# Patient Record
Sex: FEMALE | Race: WHITE | NOT HISPANIC OR LATINO | Employment: OTHER | URBAN - METROPOLITAN AREA
[De-identification: names, ages, dates, MRNs, and addresses within clinical notes are randomized per-mention and may not be internally consistent; named-entity substitution may affect disease eponyms.]

---

## 2024-07-06 ENCOUNTER — APPOINTMENT (EMERGENCY)
Dept: CT IMAGING | Facility: HOSPITAL | Age: 69
End: 2024-07-06
Payer: MEDICARE

## 2024-07-06 ENCOUNTER — HOSPITAL ENCOUNTER (EMERGENCY)
Facility: HOSPITAL | Age: 69
Discharge: HOME | End: 2024-07-06
Attending: EMERGENCY MEDICINE
Payer: MEDICARE

## 2024-07-06 VITALS
TEMPERATURE: 98.4 F | HEART RATE: 64 BPM | SYSTOLIC BLOOD PRESSURE: 188 MMHG | RESPIRATION RATE: 15 BRPM | OXYGEN SATURATION: 92 % | WEIGHT: 176 LBS | DIASTOLIC BLOOD PRESSURE: 91 MMHG

## 2024-07-06 DIAGNOSIS — R42 DIZZINESS: Primary | ICD-10-CM

## 2024-07-06 LAB
ALBUMIN SERPL BCG-MCNC: 4.6 G/DL (ref 3.5–5)
ALP SERPL-CCNC: 65 U/L (ref 34–104)
ALT SERPL W P-5'-P-CCNC: 17 U/L (ref 7–52)
ANION GAP SERPL CALCULATED.3IONS-SCNC: 7 MMOL/L (ref 4–13)
APTT PPP: 29 SECONDS (ref 23–37)
AST SERPL W P-5'-P-CCNC: 20 U/L (ref 13–39)
ATRIAL RATE: 65 BPM
BASOPHILS # BLD AUTO: 0.05 THOUSANDS/ÂΜL (ref 0–0.1)
BASOPHILS NFR BLD AUTO: 1 % (ref 0–1)
BILIRUB SERPL-MCNC: 0.54 MG/DL (ref 0.2–1)
BUN SERPL-MCNC: 15 MG/DL (ref 5–25)
CALCIUM SERPL-MCNC: 10 MG/DL (ref 8.4–10.2)
CARDIAC TROPONIN I PNL SERPL HS: 3 NG/L
CHLORIDE SERPL-SCNC: 100 MMOL/L (ref 96–108)
CO2 SERPL-SCNC: 30 MMOL/L (ref 21–32)
CREAT SERPL-MCNC: 0.63 MG/DL (ref 0.6–1.3)
EOSINOPHIL # BLD AUTO: 0.07 THOUSAND/ÂΜL (ref 0–0.61)
EOSINOPHIL NFR BLD AUTO: 1 % (ref 0–6)
ERYTHROCYTE [DISTWIDTH] IN BLOOD BY AUTOMATED COUNT: 13 % (ref 11.6–15.1)
GFR SERPL CREATININE-BSD FRML MDRD: 91 ML/MIN/1.73SQ M
GLUCOSE SERPL-MCNC: 130 MG/DL (ref 65–140)
GLUCOSE SERPL-MCNC: 150 MG/DL (ref 65–140)
HCT VFR BLD AUTO: 54.1 % (ref 34.8–46.1)
HGB BLD-MCNC: 17.6 G/DL (ref 11.5–15.4)
IMM GRANULOCYTES # BLD AUTO: 0.04 THOUSAND/UL (ref 0–0.2)
IMM GRANULOCYTES NFR BLD AUTO: 0 % (ref 0–2)
INR PPP: 0.93 (ref 0.84–1.19)
LYMPHOCYTES # BLD AUTO: 2.09 THOUSANDS/ÂΜL (ref 0.6–4.47)
LYMPHOCYTES NFR BLD AUTO: 19 % (ref 14–44)
MAGNESIUM SERPL-MCNC: 2.2 MG/DL (ref 1.9–2.7)
MCH RBC QN AUTO: 30.1 PG (ref 26.8–34.3)
MCHC RBC AUTO-ENTMCNC: 32.5 G/DL (ref 31.4–37.4)
MCV RBC AUTO: 93 FL (ref 82–98)
MONOCYTES # BLD AUTO: 0.76 THOUSAND/ÂΜL (ref 0.17–1.22)
MONOCYTES NFR BLD AUTO: 7 % (ref 4–12)
NEUTROPHILS # BLD AUTO: 8.03 THOUSANDS/ÂΜL (ref 1.85–7.62)
NEUTS SEG NFR BLD AUTO: 72 % (ref 43–75)
NRBC BLD AUTO-RTO: 0 /100 WBCS
P AXIS: 59 DEGREES
PLATELET # BLD AUTO: 336 THOUSANDS/UL (ref 149–390)
PMV BLD AUTO: 9.6 FL (ref 8.9–12.7)
POTASSIUM SERPL-SCNC: 4.8 MMOL/L (ref 3.5–5.3)
PR INTERVAL: 162 MS
PROT SERPL-MCNC: 7.2 G/DL (ref 6.4–8.4)
PROTHROMBIN TIME: 12.9 SECONDS (ref 11.6–14.5)
QRS AXIS: 53 DEGREES
QRSD INTERVAL: 90 MS
QT INTERVAL: 436 MS
QTC INTERVAL: 453 MS
RBC # BLD AUTO: 5.85 MILLION/UL (ref 3.81–5.12)
SODIUM SERPL-SCNC: 137 MMOL/L (ref 135–147)
T WAVE AXIS: 48 DEGREES
TSH SERPL DL<=0.05 MIU/L-ACNC: 2.48 UIU/ML (ref 0.45–4.5)
VENTRICULAR RATE: 65 BPM
WBC # BLD AUTO: 11.04 THOUSAND/UL (ref 4.31–10.16)

## 2024-07-06 PROCEDURE — 96361 HYDRATE IV INFUSION ADD-ON: CPT

## 2024-07-06 PROCEDURE — 70498 CT ANGIOGRAPHY NECK: CPT

## 2024-07-06 PROCEDURE — 99285 EMERGENCY DEPT VISIT HI MDM: CPT

## 2024-07-06 PROCEDURE — 85610 PROTHROMBIN TIME: CPT

## 2024-07-06 PROCEDURE — 84484 ASSAY OF TROPONIN QUANT: CPT

## 2024-07-06 PROCEDURE — 36415 COLL VENOUS BLD VENIPUNCTURE: CPT

## 2024-07-06 PROCEDURE — 70496 CT ANGIOGRAPHY HEAD: CPT

## 2024-07-06 PROCEDURE — 93005 ELECTROCARDIOGRAM TRACING: CPT

## 2024-07-06 PROCEDURE — 93010 ELECTROCARDIOGRAM REPORT: CPT | Performed by: INTERNAL MEDICINE

## 2024-07-06 PROCEDURE — 83735 ASSAY OF MAGNESIUM: CPT

## 2024-07-06 PROCEDURE — 96374 THER/PROPH/DIAG INJ IV PUSH: CPT

## 2024-07-06 PROCEDURE — 82948 REAGENT STRIP/BLOOD GLUCOSE: CPT

## 2024-07-06 PROCEDURE — 84443 ASSAY THYROID STIM HORMONE: CPT

## 2024-07-06 PROCEDURE — 85730 THROMBOPLASTIN TIME PARTIAL: CPT

## 2024-07-06 PROCEDURE — 99284 EMERGENCY DEPT VISIT MOD MDM: CPT

## 2024-07-06 PROCEDURE — 85025 COMPLETE CBC W/AUTO DIFF WBC: CPT

## 2024-07-06 PROCEDURE — 80053 COMPREHEN METABOLIC PANEL: CPT

## 2024-07-06 RX ORDER — MECLIZINE HCL 12.5 MG/1
25 TABLET ORAL ONCE
Status: COMPLETED | OUTPATIENT
Start: 2024-07-06 | End: 2024-07-06

## 2024-07-06 RX ORDER — METOCLOPRAMIDE HYDROCHLORIDE 5 MG/ML
10 INJECTION INTRAMUSCULAR; INTRAVENOUS ONCE
Status: DISCONTINUED | OUTPATIENT
Start: 2024-07-06 | End: 2024-07-06

## 2024-07-06 RX ORDER — MECLIZINE HYDROCHLORIDE 25 MG/1
25 TABLET ORAL 3 TIMES DAILY PRN
Qty: 21 TABLET | Refills: 0 | Status: SHIPPED | OUTPATIENT
Start: 2024-07-06 | End: 2024-07-13

## 2024-07-06 RX ORDER — ONDANSETRON 4 MG/1
4 TABLET, ORALLY DISINTEGRATING ORAL EVERY 6 HOURS PRN
Qty: 20 TABLET | Refills: 0 | Status: SHIPPED | OUTPATIENT
Start: 2024-07-06

## 2024-07-06 RX ORDER — ONDANSETRON 4 MG/1
4 TABLET, ORALLY DISINTEGRATING ORAL EVERY 6 HOURS PRN
Qty: 20 TABLET | Refills: 0 | Status: SHIPPED | OUTPATIENT
Start: 2024-07-06 | End: 2024-07-06

## 2024-07-06 RX ORDER — ONDANSETRON 4 MG/1
4 TABLET, ORALLY DISINTEGRATING ORAL ONCE
Status: COMPLETED | OUTPATIENT
Start: 2024-07-06 | End: 2024-07-06

## 2024-07-06 RX ORDER — MECLIZINE HYDROCHLORIDE 25 MG/1
25 TABLET ORAL 3 TIMES DAILY PRN
Qty: 21 TABLET | Refills: 0 | Status: SHIPPED | OUTPATIENT
Start: 2024-07-06 | End: 2024-07-06

## 2024-07-06 RX ORDER — ONDANSETRON 2 MG/ML
4 INJECTION INTRAMUSCULAR; INTRAVENOUS ONCE
Status: COMPLETED | OUTPATIENT
Start: 2024-07-06 | End: 2024-07-06

## 2024-07-06 RX ADMIN — MECLIZINE HYDROCHLORIDE 25 MG: 12.5 TABLET ORAL at 15:18

## 2024-07-06 RX ADMIN — MECLIZINE HYDROCHLORIDE 25 MG: 12.5 TABLET ORAL at 19:56

## 2024-07-06 RX ADMIN — SODIUM CHLORIDE 1000 ML: 0.9 INJECTION, SOLUTION INTRAVENOUS at 15:18

## 2024-07-06 RX ADMIN — ONDANSETRON 4 MG: 4 TABLET, ORALLY DISINTEGRATING ORAL at 19:56

## 2024-07-06 RX ADMIN — MECLIZINE HYDROCHLORIDE 25 MG: 12.5 TABLET ORAL at 18:15

## 2024-07-06 RX ADMIN — IOHEXOL 85 ML: 350 INJECTION, SOLUTION INTRAVENOUS at 16:04

## 2024-07-06 RX ADMIN — ONDANSETRON 4 MG: 2 INJECTION INTRAMUSCULAR; INTRAVENOUS at 15:18

## 2024-07-06 NOTE — ED NOTES
Pt unable to ambulate at this time, states she is too dizzy.     Gabriella Giraldo RN  07/06/24 7323

## 2024-07-06 NOTE — ED PROVIDER NOTES
History  Chief Complaint   Patient presents with    Dizziness     Pt to ED from home c/o dizziness since Thursday and getting worse. States it is much worse when she moves at all. Feels like she is spinning     The patient is a 69-year-old female with PMH of HTN presenting for evaluation of 2 days of dizziness.  The patient awoke 2 days ago and got out of bed quickly and reported having a feeling of being off balance.  She bumped into the TV stand on the left side.  She did not fall or hit her head.  She was able to get back in bed.  She reports thinking this may be vertigo so she has been at home, increasing fluids, and resting.  She notes the past 2 days having this dizziness when trying to get up and walk around.  She notes her symptoms worsened this morning and she feels dizzy even when moving her head or repositioning on the couch/chair.  She called her primary care doctor and was referred here for further workup.  She denies recent illnesses, fevers, chills, cough.  She denies associated headaches, lightheadedness or feeling like she may pass out, vision changes, chest pain, palpitations, difficulty breathing.  She does endorse some nausea with 1 episode of vomiting and route with EMS.  She denies history of vertigo.  She denies trauma and falls.       History provided by:  Patient   used: No    Dizziness  Associated symptoms: nausea and vomiting    Associated symptoms: no chest pain, no diarrhea, no headaches, no palpitations, no shortness of breath and no weakness        None       Past Medical History:   Diagnosis Date    Hypertension        History reviewed. No pertinent surgical history.    History reviewed. No pertinent family history.  I have reviewed and agree with the history as documented.    E-Cigarette/Vaping     E-Cigarette/Vaping Substances     Social History     Tobacco Use    Smoking status: Every Day     Current packs/day: 1.00     Types: Cigarettes    Smokeless tobacco:  Never   Substance Use Topics    Alcohol use: Never    Drug use: Yes     Types: Marijuana       Review of Systems   Constitutional:  Negative for fever.   Respiratory:  Negative for cough and shortness of breath.    Cardiovascular:  Negative for chest pain and palpitations.   Gastrointestinal:  Positive for nausea and vomiting. Negative for abdominal pain and diarrhea.   Musculoskeletal:  Positive for arthralgias (Chronic arthritis pain all over per patient) and neck pain (Left-sided). Negative for gait problem, joint swelling and neck stiffness.   Skin:  Negative for wound.   Neurological:  Positive for dizziness. Negative for tremors, seizures, syncope, facial asymmetry, speech difficulty, weakness, light-headedness, numbness and headaches.   All other systems reviewed and are negative.      Physical Exam  Physical Exam  Vitals and nursing note reviewed.   Constitutional:       General: She is awake. She is not in acute distress (Evidencing mild discomfort, no acute distress).     Appearance: Normal appearance. She is well-developed. She is not ill-appearing, toxic-appearing or diaphoretic.   HENT:      Head: Normocephalic and atraumatic.      Jaw: There is normal jaw occlusion.      Right Ear: Tympanic membrane, ear canal and external ear normal.      Left Ear: Tympanic membrane, ear canal and external ear normal.      Nose: Nose normal.      Mouth/Throat:      Lips: Pink. No lesions.      Mouth: Mucous membranes are moist.      Pharynx: Oropharynx is clear. Uvula midline.   Eyes:      General: Lids are normal. Vision grossly intact. Gaze aligned appropriately. No visual field deficit.     Extraocular Movements: Extraocular movements intact.      Right eye: No nystagmus.      Left eye: No nystagmus.      Conjunctiva/sclera: Conjunctivae normal.      Pupils: Pupils are equal, round, and reactive to light.   Neck:      Trachea: Phonation normal. No abnormal tracheal secretions.   Cardiovascular:      Rate and  Rhythm: Normal rate and regular rhythm.      Pulses:           Radial pulses are 2+ on the right side and 2+ on the left side.        Dorsalis pedis pulses are 2+ on the right side and 2+ on the left side.        Posterior tibial pulses are 2+ on the right side and 2+ on the left side.      Heart sounds: Normal heart sounds, S1 normal and S2 normal. No murmur heard.  Pulmonary:      Effort: Pulmonary effort is normal. No tachypnea or respiratory distress.      Breath sounds: Normal breath sounds and air entry. No stridor, decreased air movement or transmitted upper airway sounds. No decreased breath sounds.   Abdominal:      Palpations: Abdomen is soft.      Tenderness: There is no abdominal tenderness.   Musculoskeletal:         General: Normal range of motion.      Cervical back: Normal range of motion and neck supple.      Right lower leg: No edema.      Left lower leg: No edema.      Comments: PLUMMER, 5/5 strength throughout, sensation intact, no focal joint swelling. Ambulatory with steady gait.   Skin:     General: Skin is warm and dry.      Capillary Refill: Capillary refill takes less than 2 seconds.      Findings: No rash or wound.   Neurological:      General: No focal deficit present.      Mental Status: She is alert and oriented to person, place, and time. Mental status is at baseline.      GCS: GCS eye subscore is 4. GCS verbal subscore is 5. GCS motor subscore is 6.      Cranial Nerves: Cranial nerves 2-12 are intact.      Sensory: Sensation is intact.      Motor: Motor function is intact.      Coordination: Romberg sign negative. Finger-Nose-Finger Test and Heel to Shin Test normal.      Comments: Unable to access gait initially due to dizziness   Psychiatric:         Behavior: Behavior is cooperative.         Vital Signs  ED Triage Vitals   Temperature Pulse Respirations Blood Pressure SpO2   07/06/24 1444 07/06/24 1444 07/06/24 1444 07/06/24 1444 07/06/24 1444   98.4 °F (36.9 °C) 72 18 (!) 219/113 98 %       Temp src Heart Rate Source Patient Position - Orthostatic VS BP Location FiO2 (%)   -- 07/06/24 1711 07/06/24 1444 07/06/24 1444 --    Monitor Sitting Right arm       Pain Score       07/06/24 1444       No Pain           Vitals:    07/06/24 1444 07/06/24 1509 07/06/24 1711 07/06/24 1915   BP: (!) 219/113 (!) 205/109 166/92 (!) 188/91   Pulse: 72 76 71 64   Patient Position - Orthostatic VS: Sitting Sitting           Visual Acuity  Visual Acuity      Flowsheet Row Most Recent Value   L Pupil Size (mm) 3   R Pupil Size (mm) 3            ED Medications  Medications   sodium chloride 0.9 % bolus 1,000 mL (0 mL Intravenous Stopped 7/6/24 1815)   meclizine (ANTIVERT) tablet 25 mg (25 mg Oral Given 7/6/24 1518)   ondansetron (ZOFRAN) injection 4 mg (4 mg Intravenous Given 7/6/24 1518)   iohexol (OMNIPAQUE) 350 MG/ML injection (SINGLE-DOSE) 100 mL (85 mL Intravenous Given 7/6/24 1604)   meclizine (ANTIVERT) tablet 25 mg (25 mg Oral Given 7/6/24 1815)   meclizine (ANTIVERT) tablet 25 mg (25 mg Oral Given 7/6/24 1956)   ondansetron (ZOFRAN-ODT) dispersible tablet 4 mg (4 mg Oral Given 7/6/24 1956)       Diagnostic Studies  Results Reviewed       Procedure Component Value Units Date/Time    TSH, 3rd generation with Free T4 reflex [[id number]]  (Normal) Collected: 07/06/24 1511    Lab Status: Final result Specimen: Blood from Arm, Right Updated: 07/06/24 1556     TSH 3RD GENERATON 2.483 uIU/mL     HS Troponin 0hr (reflex protocol) [[id number]]  (Normal) Collected: 07/06/24 1511    Lab Status: Final result Specimen: Blood from Arm, Right Updated: 07/06/24 1547     hs TnI 0hr 3 ng/L     Comprehensive metabolic panel [[id number]] Collected: 07/06/24 1511    Lab Status: Final result Specimen: Blood from Arm, Right Updated: 07/06/24 1543     Sodium 137 mmol/L      Potassium 4.8 mmol/L      Chloride 100 mmol/L      CO2 30 mmol/L      ANION GAP 7 mmol/L      BUN 15 mg/dL      Creatinine 0.63 mg/dL      Glucose 130 mg/dL       Calcium 10.0 mg/dL      AST 20 U/L      ALT 17 U/L      Alkaline Phosphatase 65 U/L      Total Protein 7.2 g/dL      Albumin 4.6 g/dL      Total Bilirubin 0.54 mg/dL      eGFR 91 ml/min/1.73sq m     Narrative:      National Kid[employer] guidelines for Chronic Kidney Disease (CKD):     Stage 1 with normal or high GFR (GFR > 90 mL/min/1.73 square meters)    Stage 2 Mild CKD (GFR = 60-89 mL/min/1.73 square meters)    Stage 3A Moderate CKD (GFR = 45-59 mL/min/1.73 square meters)    Stage 3B Moderate CKD (GFR = 30-44 mL/min/1.73 square meters)    Stage 4 Severe CKD (GFR = 15-29 mL/min/1.73 square meters)    Stage 5 End Stage CKD (GFR <15 mL/min/1.73 square meters)  Note: GFR calculation is accurate only with a steady state creatinine    Magnesium [[id number]]  (Normal) Collected: 07/06/24 1511    Lab Status: Final result Specimen: Blood from Arm, Right Updated: 07/06/24 1543     Magnesium 2.2 mg/dL     Protime-INR [[id number]]  (Normal) Collected: 07/06/24 1511    Lab Status: Final result Specimen: Blood from Arm, Right Updated: 07/06/24 1539     Protime 12.9 seconds      INR 0.93    APTT [[id number]]  (Normal) Collected: 07/06/24 1511    Lab Status: Final result Specimen: Blood from Arm, Right Updated: 07/06/24 1539     PTT 29 seconds     CBC and differential [[id number]]  (Abnormal) Collected: 07/06/24 1511    Lab Status: Final result Specimen: Blood from Arm, Right Updated: 07/06/24 1526     WBC 11.04 Thousand/uL      RBC 5.85 Million/uL      Hemoglobin 17.6 g/dL      Hematocrit 54.1 %      MCV 93 fL      MCH 30.1 pg      MCHC 32.5 g/dL      RDW 13.0 %      MPV 9.6 fL      Platelets 336 Thousands/uL      nRBC 0 /100 WBCs      Segmented % 72 %      Immature Grans % 0 %      Lymphocytes % 19 %      Monocytes % 7 %      Eosinophils Relative 1 %      Basophils Relative 1 %      Absolute Neutrophils 8.03 Thousands/µL      Absolute Immature Grans 0.04 Thousand/uL      Absolute Lymphocytes 2.09 Thousands/µL       Absolute Monocytes 0.76 Thousand/µL      Eosinophils Absolute 0.07 Thousand/µL      Basophils Absolute 0.05 Thousands/µL     Fingerstick Glucose (POCT) [[id number]]  (Abnormal) Collected: 07/06/24 1448    Lab Status: Final result Specimen: Blood Updated: 07/06/24 1449     POC Glucose 150 mg/dl                    CTA head and neck with and without contrast   Final Result by Armando Morales MD (07/06 1716)   CT brain:  No acute intracranial abnormality      CTA head: Negative for large vessel intracranial occlusion or hemodynamically significant stenosis.      CTA neck:  No extracranial carotid stenosis.  The cervical vertebral arteries are patent.      A subcentimeter rim-enhancing lesion is noted along the right tonsillar pillar correlate clinically for infectious/inflammatory etiology. Follow-up CT neck examination is recommended at 3 to 6 months to exclude an underlying lesion.                     Workstation performed: [id number]                    Procedures  ECG 12 Lead Documentation Only    Date/Time: 7/6/2024 2:52 PM    Performed by: ARMOND Bill  Authorized by: ARMOND Bill    Indications / Diagnosis:  Dizziness  ECG reviewed by me, the ED Provider: yes    Patient location:  ED  Previous ECG:     Previous ECG:  Unavailable    Comparison to cardiac monitor: Yes    Interpretation:     Interpretation: non-specific    Rate:     ECG rate:  65    ECG rate assessment: normal    Rhythm:     Rhythm: sinus rhythm    Ectopy:     Ectopy: none    QRS:     QRS axis:  Normal    QRS intervals:  Normal  Conduction:     Conduction: abnormal      Abnormal conduction: non-specific intraventricular conduction delay    ST segments:     ST segments:  Normal  T waves:     T waves: normal    Comments:      Sinus rhythm, normal axis, normal intervals, no acute ischemic changes read by me           ED Course  ED Course as of 07/06/24 2034   Sat Jul 06, 2024   1712 On reevaluation, patient resting comfortably.  No  complaint of pain.  I updated her on blood work and EKG results.  She notes no nausea no dizziness currently.  Will try redosing oral meclizine.  Awaiting CTA head and neck imaging results   1723 CTA head and neck with and without contrast  IMPRESSION:  CT brain:  No acute intracranial abnormality     CTA head: Negative for large vessel intracranial occlusion or hemodynamically significant stenosis.     CTA neck:  No extracranial carotid stenosis.  The cervical vertebral arteries are patent.     A subcentimeter rim-enhancing lesion is noted along the right tonsillar pillar correlate clinically for infectious/inflammatory etiology. Follow-up CT neck examination is recommended at 3 to 6 months to exclude an underlying lesion.               HEART Risk Score      Flowsheet Row Most Recent Value   Heart Score Risk Calculator    History 0 Filed at: 07/06/2024 1724   ECG 0 Filed at: 07/06/2024 1724   Age 2 Filed at: 07/06/2024 1724   Risk Factors 1 Filed at: 07/06/2024 1724   Troponin 0 Filed at: 07/06/2024 1724   HEART Score 3 Filed at: 07/06/2024 1724                          SBIRT 20yo+      Flowsheet Row Most Recent Value   Initial Alcohol Screen: US AUDIT-C     1. How often do you have a drink containing alcohol? 0 Filed at: 07/06/2024 1448   2. How many drinks containing alcohol do you have on a typical day you are drinking?  0 Filed at: 07/06/2024 1448   3a. Male UNDER 65: How often do you have five or more drinks on one occasion? 0 Filed at: 07/06/2024 1448   3b. FEMALE Any Age, or MALE 65+: How often do you have 4 or more drinks on one occassion? 0 Filed at: 07/06/2024 1448   Audit-C Score 0 Filed at: 07/06/2024 1448   EDMUNDO: How many times in the past year have you...    Used an illegal drug or used a prescription medication for non-medical reasons? Never Filed at: 07/06/2024 1448                      Medical Decision Making  DDx including but not limited to: metabolic abnormality, cardiac abnormality, thyroid  disease, intracranial process, adverse reaction; doubt infectious process.      Patient acutely concerned for stroke as possible cause of 2 days of dizziness.  Her neurologic exam is nonfocal and CTA head and neck obtained without acute abnormality.  I discussed the 0.8 cm palatine tonsil abnormality on the CT imaging with plan for repeat CT in 3 to 6 months with PCP.  EKG without acute ischemic changes.  Troponin negative.  No thyroid dysfunction and no electrolyte derangement or organ dysfunction.  Patient with notable improvement in symptoms with meclizine and Zofran.  Able to ambulate to the bathroom independently with minimal dizziness.  Given benign neurologic exam and notable improvement in symptoms, suspicion is highest for peripheral cause of her dizziness.  Will prescribe supportive medications for home and have her follow-up with PCP in 2 to 3 days.  Discussed strict return precautions and she demonstrates understanding by teach back method.     Problems Addressed:  Dizziness: acute illness or injury    Amount and/or Complexity of Data Reviewed  Labs: ordered.  Radiology: ordered. Decision-making details documented in ED Course.  ECG/medicine tests: ordered and independent interpretation performed.    Risk  Prescription drug management.             Disposition  Final diagnoses:   Dizziness     Time reflects when diagnosis was documented in both MDM as applicable and the Disposition within this note       Time User Action Codes Description Comment    7/6/2024  7:35 PM Karma Caal Add [R42] Dizziness           ED Disposition       ED Disposition   Discharge    Condition   Stable    Date/Time   Sat Jul 6, 2024 1935    Comment   Yessenia Reeves discharge to home/self care.                   Follow-up Information       Follow up With Specialties Details Why Contact Info Additional Information    Franchesca White,  Family Medicine Schedule an appointment as soon as possible for a visit in 2 days  [address]  [facility] [address]  [facility] [address]  [phone]        [facility] Emergency Department Emergency Medicine Go to  If symptoms worsen [address] [facility] [address] [phone] [facility] Emergency Department, [address], [place] [address]            Discharge Medication List as of 7/6/2024  7:42 PM        START taking these medications    Details   meclizine (ANTIVERT) 25 mg tablet Take 1 tablet (25 mg total) by mouth 3 (three) times a day as needed for dizziness for up to 7 days, Starting Sat 7/6/2024, Until Sat 7/13/2024 at 2359, Normal      ondansetron (ZOFRAN-ODT) 4 mg disintegrating tablet Take 1 tablet (4 mg total) by mouth every 6 (six) hours as needed for nausea or vomiting, Starting Sat 7/6/2024, Normal             No discharge procedures on file.    PDMP Review       None            ED Provider  Electronically Signed by             ARMOND Bill  07/06/24 2034

## 2024-07-06 NOTE — DISCHARGE INSTRUCTIONS
Use the prescribed medication as directed for your nausea, vomiting, and dizziness.  Increase your fluids to maintain hydration.  Change positions slowly.  Follow-up with primary care in 2 to 3 days for reevaluation.    Follow up with your Primary doctor for repeat CT scan of the neck in 3-6 months for the 0.8cm palantine tonsil lesion.    Return to the ER if develop severe headache, fever, slurred speech, change in vision including vision loss, persistent dizziness or vomiting, weakness, confusion, or lethargy.